# Patient Record
Sex: FEMALE | Race: OTHER | NOT HISPANIC OR LATINO | URBAN - METROPOLITAN AREA
[De-identification: names, ages, dates, MRNs, and addresses within clinical notes are randomized per-mention and may not be internally consistent; named-entity substitution may affect disease eponyms.]

---

## 2019-02-01 ENCOUNTER — EMERGENCY (EMERGENCY)
Facility: HOSPITAL | Age: 69
LOS: 1 days | Discharge: HOME | End: 2019-02-01
Attending: EMERGENCY MEDICINE
Payer: COMMERCIAL

## 2019-02-01 VITALS
WEIGHT: 169.98 LBS | OXYGEN SATURATION: 100 % | SYSTOLIC BLOOD PRESSURE: 182 MMHG | DIASTOLIC BLOOD PRESSURE: 102 MMHG | TEMPERATURE: 98 F | HEIGHT: 66 IN | HEART RATE: 76 BPM | RESPIRATION RATE: 16 BRPM

## 2019-02-01 LAB
ALBUMIN SERPL ELPH-MCNC: 4.5 G/DL — SIGNIFICANT CHANGE UP (ref 3.3–5)
ALP SERPL-CCNC: 68 U/L — SIGNIFICANT CHANGE UP (ref 40–120)
ALT FLD-CCNC: 24 U/L — SIGNIFICANT CHANGE UP (ref 10–45)
ANION GAP SERPL CALC-SCNC: 12 MMOL/L — SIGNIFICANT CHANGE UP (ref 5–17)
AST SERPL-CCNC: 22 U/L — SIGNIFICANT CHANGE UP (ref 10–40)
BILIRUB SERPL-MCNC: 0.8 MG/DL — SIGNIFICANT CHANGE UP (ref 0.2–1.2)
BUN SERPL-MCNC: 16 MG/DL — SIGNIFICANT CHANGE UP (ref 7–23)
CALCIUM SERPL-MCNC: 10.6 MG/DL — HIGH (ref 8.4–10.5)
CHLORIDE SERPL-SCNC: 102 MMOL/L — SIGNIFICANT CHANGE UP (ref 96–108)
CO2 SERPL-SCNC: 25 MMOL/L — SIGNIFICANT CHANGE UP (ref 22–31)
CREAT SERPL-MCNC: 0.74 MG/DL — SIGNIFICANT CHANGE UP (ref 0.5–1.3)
GLUCOSE SERPL-MCNC: 114 MG/DL — HIGH (ref 70–99)
MAGNESIUM SERPL-MCNC: 2.4 MG/DL — SIGNIFICANT CHANGE UP (ref 1.6–2.6)
PHOSPHATE SERPL-MCNC: 3.3 MG/DL — SIGNIFICANT CHANGE UP (ref 2.5–4.5)
POTASSIUM SERPL-MCNC: 4.9 MMOL/L — SIGNIFICANT CHANGE UP (ref 3.5–5.3)
POTASSIUM SERPL-SCNC: 4.9 MMOL/L — SIGNIFICANT CHANGE UP (ref 3.5–5.3)
PROT SERPL-MCNC: 8.5 G/DL — HIGH (ref 6–8.3)
SODIUM SERPL-SCNC: 139 MMOL/L — SIGNIFICANT CHANGE UP (ref 135–145)

## 2019-02-01 PROCEDURE — 80053 COMPREHEN METABOLIC PANEL: CPT

## 2019-02-01 PROCEDURE — 83735 ASSAY OF MAGNESIUM: CPT

## 2019-02-01 PROCEDURE — 99284 EMERGENCY DEPT VISIT MOD MDM: CPT | Mod: 25

## 2019-02-01 PROCEDURE — 93005 ELECTROCARDIOGRAM TRACING: CPT

## 2019-02-01 PROCEDURE — 93010 ELECTROCARDIOGRAM REPORT: CPT | Mod: 59

## 2019-02-01 PROCEDURE — 84100 ASSAY OF PHOSPHORUS: CPT

## 2019-02-01 NOTE — ED PROVIDER NOTE - ATTENDING CONTRIBUTION TO CARE
Patient with concern of palpitations and history of atrial fibrillation?, no chest pain, no n/v/sob/recent travel, pain with breathing. Mild symptoms. Improved at this time. Patient states her pulse felt irregular at home.  GEN: NAD, awake, eyes open spontaneously  HEENT: NCAT, MMM, Trachea midline, normal conjunctiva, perrl  CHEST/LUNGS: Non-tachypneic, CTAB, bilateral breath sounds  CARDIAC: Non-tachycardic, normal perfusion  ABDOMEN: Soft, NTND, No rebound/guarding  MSK: No edema, no gross deformity of extremities  SKIN: No rashes, no petechiae, no vesicles  NEURO: CN grossly intact, normal coordination, no focal motor or sensory deficits  PSYCH: Alert, appropriate, cooperative, with capacity and insight   ekg and cmp reviewed and noted and relayed to patient and family  patient with PACs which could be causing her symptoms she felt prior to arrival  will  follow up with primary medical doctor and cardiology  No immediate life threatening issues present on history or clinical exam. Patient is a safe disposition home, has capacity and insight into their condition, is ambulatory in the Emergency Department with no further questions and will follow up with their doctor(s) this week. Patient and family  understand anticipatory guidance were given strict return and follow up precautions.  The patient and family have been informed of all concerning signs and symptoms to return to Emergency Department, the necessity to follow up with the PMD/Clinic/follow up provided within 2-3 days was explained, and the patient and/or family reports understanding of above with capacity and insight.

## 2019-02-01 NOTE — ED PROVIDER NOTE - OBJECTIVE STATEMENT
68F hx afib dx'd 2017 with palpitations. on and off today for 30 minutes. ASsociated with anxiety but denies cp, sob, leg swelling, fevers/chill, nausea/vomiting. no recent travel. No headaches, vision changes, no hot/cold intolerance. Was conerned because she put on her pulse monitor and thought it was "skipping beats." 68F hx afib dx'd 2017 with palpitations. on and off today for 30 minutes. Associated with anxiety but denies cp, sob, leg swelling, fevers/chill, nausea/vomiting. no recent travel. No headaches, vision changes, no hot/cold intolerance. Was concerned because she put on her pulse monitor and thought it was "skipping beats."

## 2019-02-01 NOTE — ED PROVIDER NOTE - CARDIAC, MLM
irregularly irregular rate, regular rhythm.  Heart sounds S1, S2.  No murmurs, rubs or gallops. 2+ pulses in distal extremities b/l

## 2019-02-01 NOTE — ED PROVIDER NOTE - MEDICAL DECISION MAKING DETAILS
68F hx afib p/w palpitations. asymptomatic at this time and not in RVR. Unlikely acs/PE/thyroid. Will monitor briefly for unstable arryhthmia and likely d/c with cards f/u, reassurance and return precautions. 68F hx afib p/w palpitations. asymptomatic at this time and not in RVR. Unlikely acs/PE/thyroid. Will monitor briefly for unstable arrhythmia and likely d/c with cards f/u, reassurance and return precautions.

## 2019-02-01 NOTE — ED ADULT NURSE NOTE - OBJECTIVE STATEMENT
69 y/o female PMH afib on eliquis diagnosed 2017, anxiety presents to ED c/o palpitations. She says symptoms are intermittent, lasting about 30 minutes at a time. She put on her home pulse ox and thought she felt her heart skip beats. Denies chest pain, SOB, fever, chills, n/v/d, vision changes, headache. Recent travel to [facility] a few weeks ago. Pt appears well. Lungs clear b/l. On cardiac monitor. Denying symptoms currently. Safety and comfort provided. Family at bedside.

## 2019-02-01 NOTE — ED PROVIDER NOTE - PROGRESS NOTE DETAILS
MARTHA PGy2: patient well appearing. States she feels better. Discussed lab results, return precautions.

## 2019-02-02 VITALS
DIASTOLIC BLOOD PRESSURE: 89 MMHG | SYSTOLIC BLOOD PRESSURE: 178 MMHG | OXYGEN SATURATION: 100 % | TEMPERATURE: 98 F | HEART RATE: 78 BPM | RESPIRATION RATE: 18 BRPM

## 2019-09-18 NOTE — ED PROVIDER NOTE - CONDUCTED A DETAILED DISCUSSION WITH PATIENT AND/OR GUARDIAN REGARDING, MDM
Please review this message to determine if completed. If complete, please close the encounter.   lab results/need for outpatient follow-up/return to ED if symptoms worsen, persist or questions arise
